# Patient Record
Sex: FEMALE | Employment: FULL TIME | URBAN - METROPOLITAN AREA
[De-identification: names, ages, dates, MRNs, and addresses within clinical notes are randomized per-mention and may not be internally consistent; named-entity substitution may affect disease eponyms.]

---

## 2019-07-11 NOTE — TELEPHONE ENCOUNTER
RECORDS STATUS - ALL OTHER DIAGNOSIS      RECORDS RECEIVED FROM: [facility] [address]   DATE RECEIVED: 7/16/19   NOTES STATUS DETAILS   OFFICE NOTE from referring provider Complete  Dr. Lanier   OFFICE NOTE from medical oncologist Complete  EPIC   DISCHARGE SUMMARY from hospital Complete  3/14/2019   DISCHARGE REPORT from the ER Complete  5/24/19, 4/19/2019   OPERATIVE REPORT N/A    MEDICATION LIST Complete  [facility]   CLINICAL TRIAL TREATMENTS TO DATE N/A    LABS     PATHOLOGY REPORTS N/A    ANYTHING RELATED TO DIAGNOSIS     GENONOMIC TESTING     TYPE:     IMAGING (NEED IMAGES & REPORT)     CT SCANS Complete  [facility]   MRI     [facility]     ULTRASOUND Complete  [facility]   PET Complete  [facility]     Action    Action Taken 7/11/19 3:10pm     Called [facility] labs and IMGs will be pushed soon.      Action    Action Taken 7/12/19 11:43am     Resolved IMGs ([employer]) in PACS.

## 2019-07-11 NOTE — TELEPHONE ENCOUNTER
ONCOLOGY INTAKE: Records Information      APPT INFORMATION: [id number] Dr. Gutierrez  Referring provider:  Dr. Lanier  [address]   Reason for visit/diagnosis:  Metastatic Pancreatic Cancer  Has patient been notified of appointment date and time?: Yes    RECORDS INFORMATION:  Were the records received with the referral (via [employer])? Yes    Has patient been seen for any external appt for this diagnosis? Yes    If yes, where? [facility]    Has patient had any imaging or procedures outside of [place] for this condition? Yes      If Yes, where? [facility]    ADDITIONAL INFORMATION:  Records received via [facility] to [facility]

## 2019-07-16 ENCOUNTER — PRE VISIT (OUTPATIENT)
Dept: ONCOLOGY | Facility: CLINIC | Age: 54
End: 2019-07-16

## 2019-07-17 ENCOUNTER — TELEPHONE (OUTPATIENT)
Dept: ONCOLOGY | Facility: CLINIC | Age: 54
End: 2019-07-17